# Patient Record
Sex: FEMALE | Race: WHITE | NOT HISPANIC OR LATINO | URBAN - METROPOLITAN AREA
[De-identification: names, ages, dates, MRNs, and addresses within clinical notes are randomized per-mention and may not be internally consistent; named-entity substitution may affect disease eponyms.]

---

## 2020-10-28 ENCOUNTER — OFFICE VISIT CONVERTED (OUTPATIENT)
Dept: GASTROENTEROLOGY | Facility: CLINIC | Age: 25
End: 2020-10-28
Attending: NURSE PRACTITIONER

## 2020-12-31 ENCOUNTER — HOSPITAL ENCOUNTER (OUTPATIENT)
Dept: PREADMISSION TESTING | Facility: HOSPITAL | Age: 25
Discharge: HOME | End: 2020-12-31
Attending: INTERNAL MEDICINE

## 2021-01-01 LAB — SARS-COV-2 RNA SPEC QL NAA+PROBE: NOT DETECTED

## 2021-01-05 ENCOUNTER — HOSPITAL ENCOUNTER (OUTPATIENT)
Dept: GASTROENTEROLOGY | Facility: HOSPITAL | Age: 26
Setting detail: HOSPITAL OUTPATIENT SURGERY
Discharge: HOME | End: 2021-01-05
Attending: INTERNAL MEDICINE

## 2021-01-05 LAB
GLUCOSE BLD-MCNC: 87 MG/DL (ref 65–99)
HCG SERPL-SCNC: NEGATIVE MMOL/L

## 2021-05-10 NOTE — H&P
History and Physical      Patient Name: Anthony Hendricks   Patient ID: [id number]   Sex: Female   YOB: 1995    Primary Care Provider: Sona Steele MD   Referring Provider: Sona Steele MD    Visit Date: October 28, 2020    Provider: AJAY Morales   Location: [facility]   Location Address: [address] [place] [address], [address]  [place], [place]  [id number]   Location Phone: [phone]          Chief Complaint  · Abdominal pain      History Of Present Illness  The patient is a 24 year old female who presents on referral from Sona Steele MD for a gastroenterology evaluation.      She presents for evaluation of abdominal pain.      Reports that she is having difficulty with bowel movements.  Previously experienced constipation, now is having diarrhea for the past 1-2 weeks.  Reports two bowel movements per day.  Describes stool as a #5-6 on the [place] stool chart.  Denies rectal bleeding.  States that diarrhea is worse if she eats long moncho silver's and polish sausage.      When pain and constipation were present 2 months ago, was prescribed dicyclomine per PCP, but she stopped taking it b/c she felt it made things worse.      Reports sharp, abdominal cramping that's sporadic.      Reports a good appetite.   Admits frequent nausea. Weight stable.     Reports acid reflux and states that she has a stabbing pain in chest that's sporadic and based on foods.  Omeprazole daily since 2016 with some improvement.  Denies dysphagia.      Family medical history:     Mother - IBS, Uncle  - UC.             Past Medical History  ***No Significant Medical History         Past Surgical History  [place] Tooth Extraction         Medication List  Effexor  mg oral capsule,extended release 24hr; omeprazole 20 mg oral capsule,delayed release(DR/EC); Suprep Bowel Prep Kit 17.5-3.13-1.6 gram oral recon soln         Allergy List  PENICILLINS       Allergies  "Reconciled  Family Medical History  *No Known Family History         Social History  Alcohol (Never); Tobacco (Never)         Review of Systems  · Constitutional  o Denies  o : chills, fever  · Eyes  o Denies  o : blurred vision, changes in vision  · Cardiovascular  o Denies  o : chest pain, irregular heart beats  · Respiratory  o Denies  o : shortness of breath, cough  · Gastrointestinal  o Admits  o : See HPI  · Genitourinary  o Denies  o : dysuria, blood in urine  · Integument  o Denies  o : rash, new skin lesions  · Neurologic  o Denies  o : tingling or numbness, seizures  · Musculoskeletal  o Denies  o : joint pain, joint swelling  · Endocrine  o Denies  o : weight gain, weight loss  · Psychiatric  o Denies  o : anxiety, depression      Vitals  Date Time BP Position Site L\R Cuff Size HR RR TEMP (F) WT  HT  BMI kg/m2 BSA m2 O2 Sat FR L/min FiO2 HC       10/28/2020 02:30 /58 Sitting      98.7 229lbs 6oz 5'  2\" 41.95 2.13             Physical Examination  · Constitutional  o Appearance  o : well developed, well-nourished, in no acute distress  · Eyes  o Vision  o :   § Visual Fields  § : eyes move symmetrical in all directions  o Sclerae  o : anicteric  o Pupils and Irises  o : pupils equal and symmetrical  · Neck  o Inspection/Palpation  o : supple  · Respiratory  o Respiratory Effort  o : breathing unlabored  o Inspection of Chest  o : normal appearance, no retractions  o Auscultation of Lungs  o : clear to auscultation bilaterally  · Cardiovascular  o Heart  o :   § Auscultation of Heart  § : no murmurs, gallops or rubs  · Gastrointestinal  o Abdominal Examination  o : soft, nontender to palpation, with normal active bowel sounds, no appreciable hepatosplenomegaly  o Digital Rectal Exam  o : deferred  · Lymphatic  o Neck  o : no palpable lymphadenopathy  · Skin and Subcutaneous Tissue  o General Inspection  o : without focal lesions; turgor is normal  · Psychiatric  o General  o : Alert and oriented " x3  o Mood and Affect  o : Mood and affect are appropriate to circumstances  · Extremities  o Extremities  o : No edema, no cyanosis          Assessment  · Pre-op testing     V72.84/Z01.818  · Abdominal bloating     787.3/R14.0  · Lower abdominal pain     789.09/R10.30  · Constipation     564.00/K59.00  · Diarrhea     787.91/R19.7  · Heartburn     787.1/R12      Plan  · Orders  o [facility] Pre-Op Covid-19 Screening (70230) - - 10/28/2020  o Consent for Colonoscopy with Possible Biopsy - Possible risks/complications, benefits, and alternatives to surgical or invasive procedure have been explained to patient and/or legal guardian. -Patient has been evaluated and can tolerate anesthesia and/or sedation. Risks, benefits, and alternatives to anesthesia and sedation have been explained to patient and/or legal guardian. (85802) - - 10/28/2020  o Consent for Esophagogastroduodenoscopy (EGD) - Possible risks/complications, benefits, and alternatives to surgical or invasive procedure have been explained to patient and/or legal guardian. - Patient has been evaluated and can tolerate anesthesia and/or sedation. Risks, benefits, and alternatives to anesthesia and sedation have been explained to patient and/or legal guardian. (15224) - - 10/28/2020  · Medications  o Florajen3 460 mg (7.5-6- 1.5 bill. cell) oral capsule   SIG: take 1 capsule by oral route daily for 30 days   DISP: (30) Capsule with 3 refills  Prescribed on 10/28/2020     o Medications have been Reconciled  o Transition of Care or Provider Policy  · Instructions  o Handouts provided: Pre-procedure instructions including date and time and location of procedure.  o PLAN: Proceed with procedure. Patient understands risks and benefits and is willing to proceed. Understands the risks include, but are not limited to, bleeding and/or perforation.  o Information given on current diagnoses.  o Electronically Identified Patient Education Materials Provided  Electronically  · Disposition  o Follow Up after procedure            Electronically Signed by: AJAY Morales -Author on October 30, 2020 09:29:59 AM

## 2021-05-14 VITALS
TEMPERATURE: 98.7 F | DIASTOLIC BLOOD PRESSURE: 58 MMHG | HEIGHT: 62 IN | WEIGHT: 229.37 LBS | SYSTOLIC BLOOD PRESSURE: 122 MMHG | BODY MASS INDEX: 42.21 KG/M2